# Patient Record
Sex: FEMALE | Race: WHITE | NOT HISPANIC OR LATINO | URBAN - METROPOLITAN AREA
[De-identification: names, ages, dates, MRNs, and addresses within clinical notes are randomized per-mention and may not be internally consistent; named-entity substitution may affect disease eponyms.]

---

## 2020-07-23 DIAGNOSIS — Z12.11 SCREENING FOR COLON CANCER: Primary | ICD-10-CM

## 2020-07-23 RX ORDER — SODIUM, POTASSIUM,MAG SULFATES 17.5-3.13G
1 SOLUTION, RECONSTITUTED, ORAL ORAL TAKE AS DIRECTED
Qty: 2 BOTTLE | Refills: 0 | Status: SHIPPED | OUTPATIENT
Start: 2020-07-23

## 2020-07-30 ENCOUNTER — OUTSIDE FACILITY SERVICE (OUTPATIENT)
Dept: GASTROENTEROLOGY | Facility: CLINIC | Age: 60
End: 2020-07-30

## 2020-07-30 ENCOUNTER — LAB REQUISITION (OUTPATIENT)
Dept: LAB | Facility: HOSPITAL | Age: 60
End: 2020-07-30

## 2020-07-30 DIAGNOSIS — R10.84 GENERALIZED ABDOMINAL PAIN: ICD-10-CM

## 2020-07-30 PROCEDURE — 88305 TISSUE EXAM BY PATHOLOGIST: CPT | Performed by: INTERNAL MEDICINE

## 2020-07-30 PROCEDURE — 45380 COLONOSCOPY AND BIOPSY: CPT | Performed by: INTERNAL MEDICINE

## 2020-07-31 LAB
CYTO UR: NORMAL
LAB AP CASE REPORT: NORMAL
LAB AP CLINICAL INFORMATION: NORMAL
PATH REPORT.FINAL DX SPEC: NORMAL
PATH REPORT.GROSS SPEC: NORMAL

## 2020-08-05 ENCOUNTER — TELEPHONE (OUTPATIENT)
Dept: GASTROENTEROLOGY | Facility: CLINIC | Age: 60
End: 2020-08-05

## 2020-08-05 NOTE — TELEPHONE ENCOUNTER
I called patient back. Informed her that her biopsy results isn't back yet. We will call her with the results once they are back and reviewed. Patient voiced understanding.

## 2020-08-24 ENCOUNTER — TELEPHONE (OUTPATIENT)
Dept: GASTROENTEROLOGY | Facility: CLINIC | Age: 60
End: 2020-08-24

## 2020-08-24 DIAGNOSIS — K52.9 COLITIS: Primary | ICD-10-CM

## 2020-08-24 NOTE — TELEPHONE ENCOUNTER
Dr La,  Ms Savage called and left a voice message stating that she is having terrible digestive problems. Symptoms got better when taking the Flagyl and Apriso. Now, that the antibiotics are finished. Symptoms has returned. What is the next step in treatment? Please advise. Thanks

## 2020-08-25 DIAGNOSIS — A04.72 C. DIFFICILE DIARRHEA: Primary | ICD-10-CM

## 2020-08-25 RX ORDER — METRONIDAZOLE 500 MG/1
500 TABLET ORAL 3 TIMES DAILY
Qty: 30 TABLET | Refills: 1 | Status: SHIPPED | OUTPATIENT
Start: 2020-08-25

## 2020-08-25 NOTE — TELEPHONE ENCOUNTER
Patient states he stool was solid so sample was not processed by lab. Patient states stools were normal for a few days but after finishing the antibiotics it went back diarrhea. Patient states she is having diarrhea again everyday and no normal bm's. She is picking up new rx tody. Did you want to have another C-Diff test ran?

## 2020-08-25 NOTE — TELEPHONE ENCOUNTER
I spoke with Ms Savage and gave her Dr La's recommendation.   Javier can you put patient on the waiting list please.

## 2020-08-25 NOTE — TELEPHONE ENCOUNTER
I thought I'd ordered a c.dif yesterday but having difficult seeing if I did or not.  Either way.     Check c.dif pcr  Diagnosis diarrhea    Thank you

## 2020-08-25 NOTE — TELEPHONE ENCOUNTER
I called Ms Savage back. I explained to her that I request her stool studies results form [facility] in [facility]. [facility] only sent Lab results. They stated no C Diff results. Patient she will call [facility] to see what is going on.

## 2020-10-07 ENCOUNTER — TELEMEDICINE (OUTPATIENT)
Dept: GASTROENTEROLOGY | Facility: CLINIC | Age: 60
End: 2020-10-07

## 2020-10-07 DIAGNOSIS — K51.00 ULCERATIVE PANCOLITIS WITHOUT COMPLICATION (HCC): Primary | ICD-10-CM

## 2020-10-07 PROCEDURE — 99214 OFFICE O/P EST MOD 30 MIN: CPT | Performed by: INTERNAL MEDICINE

## 2020-10-07 RX ORDER — MESALAMINE 0.38 G/1
1.5 CAPSULE, EXTENDED RELEASE ORAL DAILY
Qty: 180 CAPSULE | Refills: 3 | Status: SHIPPED | OUTPATIENT
Start: 2020-10-07 | End: 2021-05-03

## 2020-10-07 NOTE — PROGRESS NOTES
[facility]    Referring Provider: No ref. provider found        Chief complaint colitis  You have chosen to receive care through a telehealth visit.  Do you consent to use a video/audio connection for your medical care today? Yes      History of present illness:  Emily Savage is a 60 y.o. female who presents as a f/u to GI clinic via telemedicine.  History of diarrhea for many years. S/p colonoscopy showing link grade 1-2 pancolitis. She has mild ruq pain two times within this past few months. Has known cholelithiasis. Intermittent epigastric achy discomfort if she eats brown rice or sweet potato. Otherwise, she is beginning to have formed bm.    Allergies:  Patient has no allergy information on record.    Scheduled Meds:       Infusions:  No current facility-administered medications for this visit.       PRN Meds:      Home Meds:  (Not in a hospital admission)      ROS: Review of Systems  A complete 12 point ros was asked and is negative except for that mentioned above.  In particular:  No fever  No rash  No increased arthralgias  No worsening edema  No cough  No dyspnea  No chest pain    All other systems reviewed and are negative.    PAST MED HX: Pt  has no past medical history on file.  PAST SURG HX: Pt  has no past surgical history on file.  FAM HX: family history is not on file.  SOC HX: Pt      There were no vitals taken for this visit.    Physical Exam  Wt Readings from Last 3 Encounters:   No data found for Wt   ,body mass index is unknown because there is no height or weight on file.      General: well developed, well nourished  A+O x 3 NAD  HEENT: NCAT, pupils equal appearing, sclera appear white  NECK: full ROM  Respiratory: symmetric chest rise, normal effort, normal work of breathing, no overt rales  Abomen: non-distended  Skin: normal color, no jaundice  Neuro: no tremor, no facial droop  Psych: normal mood and affect            Results Review:   I reviewed the patient's new clinical  results.    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  We may have to use sulfasalazine. She is aware of ain risk  Will refill apriso    3.) Cholelithiasis  She has had 2 episodes of what appears as symptomatic episodes. I would recommend recommend assessment for ccy if persists.    rtc 3 months    30 minutes devoted to clinic visit      I discussed the patients findings and my recommendations with the patient    Mike La MD  10/07/20  15:11 EDT

## 2020-10-12 ENCOUNTER — RESULTS ENCOUNTER (OUTPATIENT)
Dept: GASTROENTEROLOGY | Facility: CLINIC | Age: 60
End: 2020-10-12

## 2020-10-12 DIAGNOSIS — K51.00 ULCERATIVE PANCOLITIS WITHOUT COMPLICATION (HCC): ICD-10-CM

## 2021-03-23 ENCOUNTER — BULK ORDERING (OUTPATIENT)
Dept: CASE MANAGEMENT | Facility: OTHER | Age: 61
End: 2021-03-23

## 2021-03-23 DIAGNOSIS — Z23 IMMUNIZATION DUE: ICD-10-CM

## 2021-05-01 DIAGNOSIS — K51.00 ULCERATIVE PANCOLITIS WITHOUT COMPLICATION (HCC): ICD-10-CM

## 2021-05-03 RX ORDER — MESALAMINE 375 MG/1
CAPSULE, EXTENDED RELEASE ORAL
Qty: 180 CAPSULE | Refills: 3 | Status: SHIPPED | OUTPATIENT
Start: 2021-05-03

## 2021-10-19 ENCOUNTER — TELEPHONE (OUTPATIENT)
Dept: GASTROENTEROLOGY | Facility: CLINIC | Age: 61
End: 2021-10-19

## 2021-10-19 NOTE — TELEPHONE ENCOUNTER
I TRIED TO CALL MS  TO REMIND HER TO GET LABS DRAWN ORDERED BY DR LEAVITT BACK ON 10/7/2020. NO ANSWER; LEFT VOICE MESSAGE. I WILL SEND MESSAGE TO PATIENT'S MY CHART.

## 2021-11-12 ENCOUNTER — TELEPHONE (OUTPATIENT)
Dept: GASTROENTEROLOGY | Facility: CLINIC | Age: 61
End: 2021-11-12

## 2021-11-12 NOTE — TELEPHONE ENCOUNTER
I SPOKE WITH MS MARTINEZ. I EXPLAINED TO HER THAT SHE IS ALSO DUE FOR A FOLLOW UP.  WE ARE STILL WAITING ON LAB RESULTS. TRANSFERRED PATIENT TO [facility] TO  SCHEDULE FOLLOW UP.

## 2021-11-12 NOTE — TELEPHONE ENCOUNTER
Patient called and LVM yesterday about results on labwork. No labs were received called [facility] for results. They are going to fax over results.

## 2021-11-16 ENCOUNTER — TELEMEDICINE (OUTPATIENT)
Dept: GASTROENTEROLOGY | Facility: CLINIC | Age: 61
End: 2021-11-16

## 2021-11-16 ENCOUNTER — TELEPHONE (OUTPATIENT)
Dept: GASTROENTEROLOGY | Facility: CLINIC | Age: 61
End: 2021-11-16

## 2021-11-16 DIAGNOSIS — K51.00 ULCERATIVE PANCOLITIS WITHOUT COMPLICATION (HCC): Primary | ICD-10-CM

## 2021-11-16 PROCEDURE — 99214 OFFICE O/P EST MOD 30 MIN: CPT | Performed by: INTERNAL MEDICINE

## 2021-11-16 RX ORDER — FOLIC ACID 1 MG/1
1 TABLET ORAL DAILY
Qty: 90 TABLET | Refills: 3 | Status: SHIPPED | OUTPATIENT
Start: 2021-11-16

## 2021-11-16 RX ORDER — SULFASALAZINE 500 MG/1
1000 TABLET ORAL 2 TIMES DAILY
Qty: 120 TABLET | Refills: 5 | Status: SHIPPED | OUTPATIENT
Start: 2021-11-16

## 2023-09-26 ENCOUNTER — TELEPHONE (OUTPATIENT)
Dept: GASTROENTEROLOGY | Facility: CLINIC | Age: 63
End: 2023-09-26
Payer: COMMERCIAL

## 2023-09-26 NOTE — TELEPHONE ENCOUNTER
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 PATIENT WOULD LIKE A CALL BACK FROM DR LEAVITT TO DISCUSS CONTINUED USE OF THE APRISO AND HOW ULCERATIVE COLITIS MAY EFFECT THE SITUATION IF AT ALL. THEY MAY TAKE OUT PATIENTS WHOLE LOWER INTESTINE, RECTUM, AND ANUS IF THAT HAPPENS WILL PATIENT STILL SUFFER FROM THE ULCERATIVE COLITIS? PATIENT WANTS HIS PERSPECTIVE BECAUSE SHE TRUST HIM. SHE WOULD PREFER TO HAVE A PHONE CALL OR ZOOM IF POSSIBLE BECAUSE HER ENERGY TO GET OUT OF THE HOUSE IS VERY LOW AND HER BLOOD COUNTS ARE LOW AND SHE DOESN'T WANT TO RISK CATCHING ANYTHING TO MAKE HER SYMPTOMS OR ISSUE WORSE THAN IT ALREADY IS.          "

## 2025-02-09 NOTE — PROGRESS NOTES
[facility]    Referring Provider: No ref. provider found        Chief complaint f/u  You have chosen to receive care through a telehealth visit.  Do you consent to use a video/audio connection for your medical care today? Yes      History of present illness:  Amy Savage is a 61 y.o. female who presents as a f/u to GI clinic via telemedicine.  Past history of uc pancolitis. Doing great on apriso but cost is an issue, 800$ a month. No gib loss. Having formed bms without urgency.    Allergies:  Patient has no allergy information on record.    Scheduled Meds:       Infusions:  No current facility-administered medications for this visit.      PRN Meds:      Home Meds:  (Not in a hospital admission)      ROS: Review of Systems  A complete 12 point ros was asked and is negative except for that mentioned above.  In particular:  No fever  No rash  No increased arthralgias  No worsening edema  No cough  No dyspnea  No chest pain    All other systems reviewed and are negative.    PAST MED HX: Pt  has no past medical history on file.  PAST SURG HX: Pt  has no past surgical history on file.  FAM HX: family history is not on file.  SOC HX: Pt      There were no vitals taken for this visit.    Physical Exam  Wt Readings from Last 3 Encounters:   No data found for Wt   ,body mass index is unknown because there is no height or weight on file.  General: well developed, well nourished  A+O x 3 NAD  HEENT: NCAT, pupils equal appearing, sclera appear white  NECK: full ROM  Respiratory: symmetric chest rise, normal effort, normal work of breathing, no overt rales  Abomen: non-distended  Skin: normal color, no jaundice  Neuro: no tremor, no facial droop  Psych: normal mood and affect                  Results Review:   I reviewed the patient's new clinical results.    No results found for: WBC, HGB, HCT, MCV, PLT    No results found for: GLUCOSE, BUN, CREATININE, EGFRIFNONA, EGFRIFAFRI, BCR, CO2, CALCIUM, PROTENTOTREF,  ALBUMIN, LABIL2, BILIRUBIN, AST, ALT    ASSESSMENTS/PLANS    1.) Mays grade 1-2 pancolitis, suspect uc  - will look into patient assistance with mesalamine/apriso.  Will send sulfasalazine with folic acid  -cmp, cbc, crp  - repeat colonoscopy 6 months (June 2022)    2.) Medication management  She reports possibly losing medicaid insurance which is covering apriso.  We may have to use sulfasalazine. She is aware of ain risk  - periodic creatinine assessment    3.) Cholelithiasis  She has had 2 episodes of what appears as symptomatic episodes. I would recommend recommend assessment for ccy if persists.          I discussed the patients findings and my recommendations with the patient    Mike La MD  11/16/21  13:50 EST     No 4 = No assist / stand by assistance